# Patient Record
Sex: MALE | Race: WHITE | Employment: FULL TIME | URBAN - METROPOLITAN AREA
[De-identification: names, ages, dates, MRNs, and addresses within clinical notes are randomized per-mention and may not be internally consistent; named-entity substitution may affect disease eponyms.]

---

## 2020-06-29 ENCOUNTER — HOSPITAL ENCOUNTER (OUTPATIENT)
Dept: LAB | Age: 50
Discharge: HOME | End: 2020-06-29

## 2020-06-29 ENCOUNTER — HOSPITAL ENCOUNTER (OUTPATIENT)
Dept: PREADMISSION TESTING | Age: 50
Discharge: HOME | End: 2020-06-29
Payer: COMMERCIAL

## 2020-06-29 DIAGNOSIS — Z01.818 PREOPERATIVE EXAMINATION, UNSPECIFIED: ICD-10-CM

## 2020-06-29 LAB
ATRIAL RATE: 85 BPM
CALCULATED P AXIS, ECG09: 64 DEGREES
CALCULATED R AXIS, ECG10: 82 DEGREES
CALCULATED T AXIS, ECG11: 57 DEGREES
DIAGNOSIS, 93000: NORMAL
P-R INTERVAL, ECG05: 152 MS
Q-T INTERVAL, ECG07: 374 MS
QRS DURATION, ECG06: 84 MS
QTC CALCULATION (BEZET), ECG08: 445 MS
SENTARA SPECIMEN COL,SENBCF: NORMAL
VENTRICULAR RATE, ECG03: 85 BPM

## 2020-06-29 PROCEDURE — 93005 ELECTROCARDIOGRAM TRACING: CPT

## 2020-06-29 PROCEDURE — 99001 SPECIMEN HANDLING PT-LAB: CPT

## 2020-07-09 ENCOUNTER — HOSPITAL ENCOUNTER (OUTPATIENT)
Dept: PREADMISSION TESTING | Age: 50
Discharge: HOME | End: 2020-07-09
Payer: COMMERCIAL

## 2020-07-09 PROCEDURE — 87635 SARS-COV-2 COVID-19 AMP PRB: CPT

## 2020-07-11 LAB — SARS-COV-2, COV2NT: NOT DETECTED

## 2020-07-15 ENCOUNTER — ANESTHESIA EVENT (OUTPATIENT)
Dept: SURGERY | Age: 50
End: 2020-07-15
Payer: COMMERCIAL

## 2020-07-15 ENCOUNTER — ANESTHESIA (OUTPATIENT)
Dept: SURGERY | Age: 50
End: 2020-07-15
Payer: COMMERCIAL

## 2020-07-15 ENCOUNTER — HOSPITAL ENCOUNTER (OUTPATIENT)
Age: 50
Setting detail: OUTPATIENT SURGERY
Discharge: HOME | End: 2020-07-15
Attending: UROLOGY | Admitting: UROLOGY
Payer: COMMERCIAL

## 2020-07-15 VITALS
HEIGHT: 75 IN | SYSTOLIC BLOOD PRESSURE: 153 MMHG | OXYGEN SATURATION: 98 % | TEMPERATURE: 97.3 F | DIASTOLIC BLOOD PRESSURE: 91 MMHG | WEIGHT: 224.19 LBS | RESPIRATION RATE: 18 BRPM | HEART RATE: 79 BPM | BODY MASS INDEX: 27.87 KG/M2

## 2020-07-15 DIAGNOSIS — N40.1 BPH WITH OBSTRUCTION/LOWER URINARY TRACT SYMPTOMS: Primary | ICD-10-CM

## 2020-07-15 DIAGNOSIS — N13.8 BPH WITH OBSTRUCTION/LOWER URINARY TRACT SYMPTOMS: Primary | ICD-10-CM

## 2020-07-15 PROCEDURE — 76210000000 HC OR PH I REC 2 TO 2.5 HR: Performed by: UROLOGY

## 2020-07-15 PROCEDURE — 76010000162 HC OR TIME 1.5 TO 2 HR INTENSV-TIER 1: Performed by: UROLOGY

## 2020-07-15 PROCEDURE — 77030020782 HC GWN BAIR PAWS FLX 3M -B: Performed by: UROLOGY

## 2020-07-15 PROCEDURE — 77030018836 HC SOL IRR NACL ICUM -A: Performed by: UROLOGY

## 2020-07-15 PROCEDURE — 74011000250 HC RX REV CODE- 250: Performed by: ANESTHESIOLOGY

## 2020-07-15 PROCEDURE — 77030034696 HC CATH URETH FOL 2W BARD -A: Performed by: UROLOGY

## 2020-07-15 PROCEDURE — 77030005546 HC CATH URETH FOL 3W BARD -A: Performed by: UROLOGY

## 2020-07-15 PROCEDURE — 77030040831 HC BAG URINE DRNG MDII -A: Performed by: UROLOGY

## 2020-07-15 PROCEDURE — 74011250636 HC RX REV CODE- 250/636: Performed by: UROLOGY

## 2020-07-15 PROCEDURE — 76060000034 HC ANESTHESIA 1.5 TO 2 HR: Performed by: UROLOGY

## 2020-07-15 PROCEDURE — 77030018842 HC SOL IRR SOD CL 9% BAXT -A: Performed by: UROLOGY

## 2020-07-15 PROCEDURE — 77030028158 HC ELECTRD BISOLSR PROST RWOL -B: Performed by: UROLOGY

## 2020-07-15 PROCEDURE — 74011250636 HC RX REV CODE- 250/636: Performed by: NURSE ANESTHETIST, CERTIFIED REGISTERED

## 2020-07-15 PROCEDURE — C1769 GUIDE WIRE: HCPCS | Performed by: UROLOGY

## 2020-07-15 PROCEDURE — 76210000020 HC REC RM PH II FIRST 0.5 HR: Performed by: UROLOGY

## 2020-07-15 PROCEDURE — 74011250637 HC RX REV CODE- 250/637: Performed by: ANESTHESIOLOGY

## 2020-07-15 PROCEDURE — 77030028773 HC KT ACL RAP-PAC DISP S&N -C: Performed by: UROLOGY

## 2020-07-15 PROCEDURE — 77030012508 HC MSK AIRWY LMA AMBU -A: Performed by: ANESTHESIOLOGY

## 2020-07-15 PROCEDURE — 74011250637 HC RX REV CODE- 250/637: Performed by: UROLOGY

## 2020-07-15 PROCEDURE — 74011000250 HC RX REV CODE- 250: Performed by: NURSE ANESTHETIST, CERTIFIED REGISTERED

## 2020-07-15 PROCEDURE — 77030018831 HC SOL IRR H20 BAXT -A: Performed by: UROLOGY

## 2020-07-15 PROCEDURE — 74011250636 HC RX REV CODE- 250/636: Performed by: ANESTHESIOLOGY

## 2020-07-15 RX ORDER — DOCUSATE SODIUM 100 MG/1
100 CAPSULE, LIQUID FILLED ORAL 2 TIMES DAILY
Qty: 30 CAP | Refills: 0 | Status: SHIPPED | OUTPATIENT
Start: 2020-07-15 | End: 2020-07-30

## 2020-07-15 RX ORDER — LABETALOL HYDROCHLORIDE 5 MG/ML
5 INJECTION, SOLUTION INTRAVENOUS ONCE
Status: COMPLETED | OUTPATIENT
Start: 2020-07-15 | End: 2020-07-15

## 2020-07-15 RX ORDER — MAGNESIUM SULFATE 100 %
4 CRYSTALS MISCELLANEOUS AS NEEDED
Status: DISCONTINUED | OUTPATIENT
Start: 2020-07-15 | End: 2020-07-15 | Stop reason: HOSPADM

## 2020-07-15 RX ORDER — SODIUM CHLORIDE, SODIUM LACTATE, POTASSIUM CHLORIDE, CALCIUM CHLORIDE 600; 310; 30; 20 MG/100ML; MG/100ML; MG/100ML; MG/100ML
125 INJECTION, SOLUTION INTRAVENOUS CONTINUOUS
Status: DISCONTINUED | OUTPATIENT
Start: 2020-07-15 | End: 2020-07-15 | Stop reason: HOSPADM

## 2020-07-15 RX ORDER — MIDAZOLAM HYDROCHLORIDE 1 MG/ML
INJECTION, SOLUTION INTRAMUSCULAR; INTRAVENOUS AS NEEDED
Status: DISCONTINUED | OUTPATIENT
Start: 2020-07-15 | End: 2020-07-15 | Stop reason: HOSPADM

## 2020-07-15 RX ORDER — SODIUM CHLORIDE 0.9 % (FLUSH) 0.9 %
5-40 SYRINGE (ML) INJECTION EVERY 8 HOURS
Status: DISCONTINUED | OUTPATIENT
Start: 2020-07-15 | End: 2020-07-15 | Stop reason: HOSPADM

## 2020-07-15 RX ORDER — SODIUM CHLORIDE 0.9 % (FLUSH) 0.9 %
5-40 SYRINGE (ML) INJECTION AS NEEDED
Status: DISCONTINUED | OUTPATIENT
Start: 2020-07-15 | End: 2020-07-15 | Stop reason: HOSPADM

## 2020-07-15 RX ORDER — FENTANYL CITRATE 50 UG/ML
25 INJECTION, SOLUTION INTRAMUSCULAR; INTRAVENOUS
Status: DISCONTINUED | OUTPATIENT
Start: 2020-07-15 | End: 2020-07-15 | Stop reason: HOSPADM

## 2020-07-15 RX ORDER — KETAMINE HYDROCHLORIDE 10 MG/ML
INJECTION, SOLUTION INTRAMUSCULAR; INTRAVENOUS AS NEEDED
Status: DISCONTINUED | OUTPATIENT
Start: 2020-07-15 | End: 2020-07-15 | Stop reason: HOSPADM

## 2020-07-15 RX ORDER — HYDROCODONE BITARTRATE AND ACETAMINOPHEN 5; 325 MG/1; MG/1
1 TABLET ORAL AS NEEDED
Status: DISCONTINUED | OUTPATIENT
Start: 2020-07-15 | End: 2020-07-15 | Stop reason: HOSPADM

## 2020-07-15 RX ORDER — HYDROMORPHONE HYDROCHLORIDE 2 MG/ML
0.2 INJECTION, SOLUTION INTRAMUSCULAR; INTRAVENOUS; SUBCUTANEOUS AS NEEDED
Status: DISCONTINUED | OUTPATIENT
Start: 2020-07-15 | End: 2020-07-15 | Stop reason: HOSPADM

## 2020-07-15 RX ORDER — FENTANYL CITRATE 50 UG/ML
INJECTION, SOLUTION INTRAMUSCULAR; INTRAVENOUS AS NEEDED
Status: DISCONTINUED | OUTPATIENT
Start: 2020-07-15 | End: 2020-07-15 | Stop reason: HOSPADM

## 2020-07-15 RX ORDER — LIDOCAINE HYDROCHLORIDE 20 MG/ML
INJECTION, SOLUTION EPIDURAL; INFILTRATION; INTRACAUDAL; PERINEURAL AS NEEDED
Status: DISCONTINUED | OUTPATIENT
Start: 2020-07-15 | End: 2020-07-15 | Stop reason: HOSPADM

## 2020-07-15 RX ORDER — ONDANSETRON 2 MG/ML
4 INJECTION INTRAMUSCULAR; INTRAVENOUS ONCE
Status: DISCONTINUED | OUTPATIENT
Start: 2020-07-15 | End: 2020-07-15 | Stop reason: HOSPADM

## 2020-07-15 RX ORDER — OXYCODONE AND ACETAMINOPHEN 5; 325 MG/1; MG/1
1 TABLET ORAL
Qty: 15 TAB | Refills: 0 | Status: SHIPPED | OUTPATIENT
Start: 2020-07-15 | End: 2020-07-22

## 2020-07-15 RX ORDER — NALOXONE HYDROCHLORIDE 0.4 MG/ML
0.1 INJECTION, SOLUTION INTRAMUSCULAR; INTRAVENOUS; SUBCUTANEOUS AS NEEDED
Status: DISCONTINUED | OUTPATIENT
Start: 2020-07-15 | End: 2020-07-15 | Stop reason: HOSPADM

## 2020-07-15 RX ORDER — ATROPA BELLADONNA AND OPIUM 16.2; 3 MG/1; MG/1
1 SUPPOSITORY RECTAL
Status: DISCONTINUED | OUTPATIENT
Start: 2020-07-15 | End: 2020-07-15 | Stop reason: HOSPADM

## 2020-07-15 RX ORDER — DEXTROSE MONOHYDRATE 100 MG/ML
125-250 INJECTION, SOLUTION INTRAVENOUS AS NEEDED
Status: DISCONTINUED | OUTPATIENT
Start: 2020-07-15 | End: 2020-07-15 | Stop reason: HOSPADM

## 2020-07-15 RX ORDER — DEXAMETHASONE SODIUM PHOSPHATE 4 MG/ML
INJECTION, SOLUTION INTRA-ARTICULAR; INTRALESIONAL; INTRAMUSCULAR; INTRAVENOUS; SOFT TISSUE AS NEEDED
Status: DISCONTINUED | OUTPATIENT
Start: 2020-07-15 | End: 2020-07-15 | Stop reason: HOSPADM

## 2020-07-15 RX ORDER — DIPHENHYDRAMINE HYDROCHLORIDE 50 MG/ML
12.5 INJECTION, SOLUTION INTRAMUSCULAR; INTRAVENOUS
Status: DISCONTINUED | OUTPATIENT
Start: 2020-07-15 | End: 2020-07-15 | Stop reason: HOSPADM

## 2020-07-15 RX ORDER — ONDANSETRON 2 MG/ML
INJECTION INTRAMUSCULAR; INTRAVENOUS AS NEEDED
Status: DISCONTINUED | OUTPATIENT
Start: 2020-07-15 | End: 2020-07-15 | Stop reason: HOSPADM

## 2020-07-15 RX ORDER — CEFAZOLIN SODIUM 2 G/50ML
2 SOLUTION INTRAVENOUS ONCE
Status: COMPLETED | OUTPATIENT
Start: 2020-07-15 | End: 2020-07-15

## 2020-07-15 RX ORDER — SODIUM CHLORIDE, SODIUM LACTATE, POTASSIUM CHLORIDE, CALCIUM CHLORIDE 600; 310; 30; 20 MG/100ML; MG/100ML; MG/100ML; MG/100ML
150 INJECTION, SOLUTION INTRAVENOUS CONTINUOUS
Status: DISCONTINUED | OUTPATIENT
Start: 2020-07-15 | End: 2020-07-15 | Stop reason: HOSPADM

## 2020-07-15 RX ORDER — CEPHALEXIN 500 MG/1
500 CAPSULE ORAL 2 TIMES DAILY
Qty: 10 CAP | Refills: 0 | Status: SHIPPED | OUTPATIENT
Start: 2020-07-15

## 2020-07-15 RX ORDER — PHENAZOPYRIDINE HYDROCHLORIDE 200 MG/1
200 TABLET, FILM COATED ORAL
Qty: 21 TAB | Refills: 0 | Status: SHIPPED | OUTPATIENT
Start: 2020-07-15 | End: 2020-07-22

## 2020-07-15 RX ORDER — PROPOFOL 10 MG/ML
INJECTION, EMULSION INTRAVENOUS AS NEEDED
Status: DISCONTINUED | OUTPATIENT
Start: 2020-07-15 | End: 2020-07-15 | Stop reason: HOSPADM

## 2020-07-15 RX ORDER — INSULIN LISPRO 100 [IU]/ML
INJECTION, SOLUTION INTRAVENOUS; SUBCUTANEOUS ONCE
Status: DISCONTINUED | OUTPATIENT
Start: 2020-07-15 | End: 2020-07-15 | Stop reason: HOSPADM

## 2020-07-15 RX ORDER — ALBUTEROL SULFATE 0.83 MG/ML
2.5 SOLUTION RESPIRATORY (INHALATION) AS NEEDED
Status: DISCONTINUED | OUTPATIENT
Start: 2020-07-15 | End: 2020-07-15 | Stop reason: HOSPADM

## 2020-07-15 RX ADMIN — KETAMINE HYDROCHLORIDE 10 MG: 10 INJECTION, SOLUTION INTRAMUSCULAR; INTRAVENOUS at 14:43

## 2020-07-15 RX ADMIN — SODIUM CHLORIDE, SODIUM LACTATE, POTASSIUM CHLORIDE, AND CALCIUM CHLORIDE: 600; 310; 30; 20 INJECTION, SOLUTION INTRAVENOUS at 14:36

## 2020-07-15 RX ADMIN — KETAMINE HYDROCHLORIDE 10 MG: 10 INJECTION, SOLUTION INTRAMUSCULAR; INTRAVENOUS at 14:36

## 2020-07-15 RX ADMIN — LABETALOL HYDROCHLORIDE 5 MG: 5 INJECTION, SOLUTION INTRAVENOUS at 17:22

## 2020-07-15 RX ADMIN — SODIUM CHLORIDE, SODIUM LACTATE, POTASSIUM CHLORIDE, AND CALCIUM CHLORIDE 150 ML/HR: 600; 310; 30; 20 INJECTION, SOLUTION INTRAVENOUS at 18:23

## 2020-07-15 RX ADMIN — FENTANYL CITRATE 25 MCG: 50 INJECTION, SOLUTION INTRAMUSCULAR; INTRAVENOUS at 15:38

## 2020-07-15 RX ADMIN — LIDOCAINE HYDROCHLORIDE 80 MG: 20 INJECTION, SOLUTION EPIDURAL; INFILTRATION; INTRACAUDAL; PERINEURAL at 14:41

## 2020-07-15 RX ADMIN — FENTANYL CITRATE 50 MCG: 50 INJECTION, SOLUTION INTRAMUSCULAR; INTRAVENOUS at 14:47

## 2020-07-15 RX ADMIN — FENTANYL CITRATE 25 MCG: 50 INJECTION, SOLUTION INTRAMUSCULAR; INTRAVENOUS at 15:30

## 2020-07-15 RX ADMIN — KETAMINE HYDROCHLORIDE 10 MG: 10 INJECTION, SOLUTION INTRAMUSCULAR; INTRAVENOUS at 15:58

## 2020-07-15 RX ADMIN — ONDANSETRON HYDROCHLORIDE 4 MG: 2 INJECTION INTRAMUSCULAR; INTRAVENOUS at 14:49

## 2020-07-15 RX ADMIN — FENTANYL CITRATE 25 MCG: 50 INJECTION, SOLUTION INTRAMUSCULAR; INTRAVENOUS at 18:14

## 2020-07-15 RX ADMIN — PROPOFOL 160 MG: 10 INJECTION, EMULSION INTRAVENOUS at 14:42

## 2020-07-15 RX ADMIN — DEXAMETHASONE SODIUM PHOSPHATE 4 MG: 4 INJECTION, SOLUTION INTRAMUSCULAR; INTRAVENOUS at 15:42

## 2020-07-15 RX ADMIN — SODIUM CHLORIDE, SODIUM LACTATE, POTASSIUM CHLORIDE, AND CALCIUM CHLORIDE 125 ML/HR: 600; 310; 30; 20 INJECTION, SOLUTION INTRAVENOUS at 12:08

## 2020-07-15 RX ADMIN — CEFAZOLIN SODIUM 2 G: 2 SOLUTION INTRAVENOUS at 14:45

## 2020-07-15 RX ADMIN — SODIUM CHLORIDE, SODIUM LACTATE, POTASSIUM CHLORIDE, AND CALCIUM CHLORIDE: 600; 310; 30; 20 INJECTION, SOLUTION INTRAVENOUS at 15:42

## 2020-07-15 RX ADMIN — ATROPA BELLADONNA AND OPIUM 1 SUPPOSITORY: 16.2; 3 SUPPOSITORY RECTAL at 18:38

## 2020-07-15 RX ADMIN — LABETALOL HYDROCHLORIDE 5 MG: 5 INJECTION, SOLUTION INTRAVENOUS at 17:53

## 2020-07-15 RX ADMIN — MIDAZOLAM 2 MG: 1 INJECTION INTRAMUSCULAR; INTRAVENOUS at 14:36

## 2020-07-15 RX ADMIN — PROPOFOL 40 MG: 10 INJECTION, EMULSION INTRAVENOUS at 15:54

## 2020-07-15 RX ADMIN — HYDROCODONE BITARTRATE AND ACETAMINOPHEN 1 TABLET: 5; 325 TABLET ORAL at 18:59

## 2020-07-15 NOTE — PERIOP NOTES
TRANSFER - OUT REPORT:    Verbal report given to JADA Mckinnon(name) on Juana Sacks  being transferred to phase 2(unit) for routine post - op       Report consisted of patients Situation, Background, Assessment and   Recommendations(SBAR). Information from the following report(s) SBAR, Kardex, OR Summary, Procedure Summary, Intake/Output and MAR was reviewed with the receiving nurse. Lines:   Peripheral IV 07/15/20 Left Hand (Active)   Site Assessment Clean, dry, & intact 07/15/20 1708   Phlebitis Assessment 0 07/15/20 1708   Infiltration Assessment 0 07/15/20 1708   Dressing Status Clean, dry, & intact 07/15/20 1708   Dressing Type Transparent;Tape 07/15/20 1708   Hub Color/Line Status Infusing 07/15/20 1708        Opportunity for questions and clarification was provided.       Patient transported with:   Registered Nurse  Tech

## 2020-07-15 NOTE — ANESTHESIA POSTPROCEDURE EVALUATION
Post-Anesthesia Evaluation and Assessment    Cardiovascular Function/Vital Signs  Visit Vitals  BP (!) 146/94   Pulse 75   Temp 36.3 °C (97.3 °F)   Resp 15   Ht 6' 3\" (1.905 m)   Wt 101.7 kg (224 lb 3 oz)   SpO2 98%   BMI 28.02 kg/m²       Patient is status post Procedure(s):  GREENLIGHT LASER OF PROSTATE. Nausea/Vomiting: Controlled. Postoperative hydration reviewed and adequate. Pain:  Pain Scale 1: Visual (07/15/20 1830)  Pain Intensity 1: 0 (07/15/20 1830)   Managed. Neurological Status:   Neuro (WDL): Exceptions to WDL (07/15/20 1735)   At baseline. Mental Status and Level of Consciousness: Arousable. Pulmonary Status:   O2 Device: Room air (07/15/20 1810)   Adequate oxygenation and airway patent. Complications related to anesthesia: None    Post-anesthesia assessment completed. H/o HTN. Within 20% of baseline 160/91 on chart review. Patient has met all discharge requirements.     Signed By: Andrea Segura MD    July 15, 2020

## 2020-07-15 NOTE — ANESTHESIA PREPROCEDURE EVALUATION
Relevant Problems   No relevant active problems       Anesthetic History   No history of anesthetic complications            Review of Systems / Medical History  Patient summary reviewed, nursing notes reviewed and pertinent labs reviewed    Pulmonary  Within defined limits                 Neuro/Psych   Within defined limits           Cardiovascular    Hypertension              Exercise tolerance: >4 METS     GI/Hepatic/Renal     GERD           Endo/Other  Within defined limits           Other Findings              Physical Exam    Airway  Mallampati: II  TM Distance: 4 - 6 cm  Neck ROM: normal range of motion   Mouth opening: Normal     Cardiovascular  Regular rate and rhythm,  S1 and S2 normal,  no murmur, click, rub, or gallop             Dental  No notable dental hx       Pulmonary  Breath sounds clear to auscultation               Abdominal  GI exam deferred       Other Findings            Anesthetic Plan    ASA: 2  Anesthesia type: general          Induction: Intravenous  Anesthetic plan and risks discussed with: Patient

## 2020-07-15 NOTE — PERIOP NOTES
Patient admits to having a responsible adult care for them for at least 24 hours after surgery. Dual skin assessment completed by Manoj ELAIS and [facility]

## 2020-07-15 NOTE — H&P
Update History & Physical    The Patient's History and Physical of June 18/2020,  was reviewed with the patient and I examined the patient. There was no change. The surgical site was confirmed by the patient and me. Plan:  The risk, benefits, expected outcome, and alternative to the recommended procedure have been discussed with the patient. Patient understands and wants to proceed with the procedure. Electronically signed by Georgiana Keen MD on 7/15/2020 at 2:22 PM      [facility]  [id number]  Tel: [phone]  Fax: [phone]      Patient: Gloria Bryant  YOB: 1970  Date: 06/18/2020 3:00 PM   Visit Type: Office Visit        Assessment/Plan  # Detail Type Description    1. Assessment Benign non-nodular prostatic hyperplasia with lower urinary tract symptoms (N40.1). Patient Plan I discussed with the patient regarding his failed medical therapy for BPH. We had previously discussed with the vapor the prostate verses GreenLight. Her fortunately his insurance is not covering the word of a pr of the prostate. He has elected to proceed with the GreenLight PVP. I discussed the risk benefits and the alternatives any for intervention, Delgado catheter, UTI, stage procedure, blood in the urine. He understands wishes to proceed. He will undergo P AT per hospital protocol. All questions were answered today. He will be tentatively [facility] for [facility]15/2020 [facility].  Urine culture sent today. Plan Orders URINE CULTURE to be performed. Obtained on 06/18/2020.         2. Assessment Poor urinary stream (R39.12). 3. Other Orders Orders not associated to today's assessments. Plan Orders URINE CULTURE to be performed. Obtained on 06/18/2020. This 52year old male presents for BPH and erectile dysfunction uro. History of Present Illness:  1. BPH   Onset was gradual. Severity level is 6.  It occurs daily. The problem is worse. Associated symptoms include incomplete emptying, intermittent stream, nocturia (2 times per night), slow stream and urinary frequency (every 2 hours). Pertinent negatives include chills, constipation, dysuria, fever, hematuria, urgency, urinary incontinence and urinary straining. Additional information: Pt was placed on flomax years ago and caused sexual side effects. 4 weeks of flomax no improvement. Symptoms getting worse. Patient is unhappy with the symptoms. Unfortunately patient insurance is not covering rezum water vapor the prostate at this point. .      2.  erectile dysfunction uro   The symptoms began gradually and have been moderate. The patient is here today for an initial visit. The patient states he does have difficultly attaining an erection and has difficulty maintaining an erection. Pertinent history includes use of diuretics but not diabetes or neurologic disease. He also complains of difficulty achieving erections and difficulty maintaining erections but denies depression, morning erections or nocturnal erections. Additional information: Weak erections for couple of years now. Getting worse. PAST MEDICAL/SURGICAL HISTORY   (Reviewed, updated)    Disease/disorder Onset Date Management Date Comments   knee 2011   ANR 05/29/2018 -   Back 2008   ANR 05/29/2018 -   Hernia 1976   ANR 05/29/2018 -   Hypertension             PROBLEM LIST:   Problem List reviewed.    Problem Description Onset Date Chronic Clinical Status Notes   Poor urinary stream  N           Medications (active prior to today)  Medication Name Sig Description Start Date Stop Date Refilled Rx Elsewhere   irbesartan 150 mg tablet take 1 tablet by oral route  every day //   Y   hydrochlorothiazide 12.5 mg tablet take 1 tablet by oral route  every day //   Y   pantoprazole 40 mg tablet,delayed release take 1 tablet by oral route  every day //   Y   tadalafil 20 mg tablet take 1 tablet by oral route  every day 2020   N     Medication Reconciliation  Medications reconciled today. Allergies  Ingredient Reaction (Severity) Medication Name Comment   NO KNOWN ALLERGIES        Reviewed, no changes. Family History  (Reviewed, updated)  Relationship Family Member Name  Age at Death Condition Onset Age Cause of Death   Father  Y 43 Cancer - bone     Mother    Cancer - breast           Social History:  (Reviewed, updated)  Tobacco use reviewed. Preferred language is [place]. MARITAL STATUS/FAMILY/SOCIAL SUPPORT  Marital status: Single   Smoking status: Unknown if ever smoked. TOBACCO SCREENING:  It is unknown if the patient has ever used tobacco.     SMOKING STATUS  Type Smoking Status Usage Per Day Years Used Pack Years Total Pack Years    Unknown if ever smoked                         Review of Systems  System Neg/Pos Details   Constitutional Negative Chills and Fever. ENMT Negative Ear infections and Sore throat. Eyes Negative Blurred vision, Double vision and Eye pain. Respiratory Negative Asthma, Chronic cough, Dyspnea and Wheezing. Cardio Negative Chest pain. GI Negative Constipation, Decreased appetite, Diarrhea, Nausea and Vomiting.  Positive Difficulty achieving erections, Difficulty maintaining erections, Incomplete emptying, Intermittent urinary stream, Nocturia, Slow stream, Urinary frequency.  Negative Dysuria, Hematuria, Morning erections, Nocturnal erections, Urgency, Urinary incontinence and Urinary straining. Endocrine Negative Cold intolerance, Heat intolerance, Increased thirst and Weight loss. Neuro Negative Headache and Tremors. Psych Negative Anxiety and Depression. Integumentary Negative Itching skin and Rash. MS Negative Back pain and Joint pain. Hema/Lymph Negative Easy bleeding. Reproductive Negative Sexual dysfunction.      Vital Signs   Height  Time ft in cm Last Measured Height Position   3:25 PM 6.0 3.00 190.50 2018 0 3:24 PM 6.0 3.00 190.50 05/29/2018 0   Weight/BSA/BMI  Time lb oz kg Context BMI kg/m2 BSA m2   3:25 .00  101.151  27.87    Measured By  Time Measured by   3:25 PM [facility]   3:24 PM [facility]     Physical Exam  Exam Findings Details   Constitutional Normal Well developed. Respiratory Normal Inspection - Normal.   Musculoskeletal Normal Gait - Normal.   Neurological Normal Level of consciousness - Normal. Orientation - Normal. Memory - Normal.   Psychiatric Normal Oriented to time, place, person and situation. Appropriate mood and affect.          Medications (added, continued, or stopped today)  Start Date Medication Directions PRN Status PRN Reason Instruction Stop Date    hydrochlorothiazide 12.5 mg tablet take 1 tablet by oral route  every day N       irbesartan 150 mg tablet take 1 tablet by oral route  every day N       pantoprazole 40 mg tablet,delayed release take 1 tablet by oral route  every day N      02/18/2020 tadalafil 20 mg tablet take 1 tablet by oral route  every day N      Active Patient Care Team Members    Name Contact Agency Type Support Role Relationship Active Date Inactive Date Specialty   Federico Rios   Patient provider PCP      Radha Ontiveros   encounter provider    Urology   Mark Parsons   Emergency Contact Other          Provider: Fortino Love MD 06/18/2020 03:00 PM  Document generated by:  Glenny Almanzar 06/22/2020 12:46 PM  CC Providers:  Federico Rios   [address] [place] [address]-  [phone]          Electronically signed by Fortino Love MD on 06/22/2020 12:59 PM

## 2020-07-15 NOTE — DISCHARGE INSTRUCTIONS
2 [facility], Urology     [facility], [address], [address] [place] [address]  Office: [phone]  Fax:    [phone] PVP  __  Notify [facility] IMMEDIATELY if any of the following occur:     You are unable to urinate. Urgency to urinate is not uncommon.   You find yourself urinating small frequent amounts associated with severe lower abdominal discomfort.   Bright red blood with clots in the urine. Some reddish urine is not uncommon and should be treated with increasing the amount of fluids you drink.   Temperature above 101.5° and / or chills.   You are nauseous and / or vomiting and you cannot hold down any fluids.   Your pain is not controlled with the pain medication prescribed. Special Considerations:      Do not drive for at least 5 days after the procedure and until you are no longer taking narcotic pain medication and you are able to move and react without hesitation.  You may return to work in 1 week.  _x_  No heavy lifting over 10 pounds & no strenuous exercise for 1 week     _x_  Our office will call you to make an appointment Monday for dunaway cath removal.     Please contact [facility]. Urology at [phone] or go to the nearest Emergency Department / Urgent Care facility for any other medical questions or concerns. DISCHARGE SUMMARY from Nurse    PATIENT INSTRUCTIONS:    After general anesthesia or intravenous sedation, for 24 hours or while taking prescription Narcotics:  · Limit your activities  · Do not drive and operate hazardous machinery  · Do not make important personal or business decisions  · Do  not drink alcoholic beverages  · If you have not urinated within 8 hours after discharge, please contact your surgeon on call.     Report the following to your surgeon:  · Excessive pain, swelling, redness or odor of or around the surgical area  · Temperature over 100.5  · Nausea and vomiting lasting longer than 4 hours or if unable to take medications  · Any signs of decreased circulation or nerve impairment to extremity: change in color, persistent  numbness, tingling, coldness or increase pain  · Any questions    What to do at Home:  Recommended activity: Activity as tolerated and No driving while on analgesics,     If you experience any of the following symptoms fever, chills, persistent nausea and vomiting, and uncontrollable pain, please follow up with Dr. Audrey Lowe. *  Please give a list of your current medications to your Primary Care Provider. *  Please update this list whenever your medications are discontinued, doses are      changed, or new medications (including over-the-counter products) are added. *  Please carry medication information at all times in case of emergency situations. These are general instructions for a healthy lifestyle:    No smoking/ No tobacco products/ Avoid exposure to second hand smoke  Surgeon General's Warning:  Quitting smoking now greatly reduces serious risk to your health. Obesity, smoking, and sedentary lifestyle greatly increases your risk for illness    A healthy diet, regular physical exercise & weight monitoring are important for maintaining a healthy lifestyle    You may be retaining fluid if you have a history of heart failure or if you experience any of the following symptoms:  Weight gain of 3 pounds or more overnight or 5 pounds in a week, increased swelling in our hands or feet or shortness of breath while lying flat in bed. Please call your doctor as soon as you notice any of these symptoms; do not wait until your next office visit. The discharge information has been reviewed with the patient and caregiver. The patient and caregiver verbalized understanding.   Discharge medications reviewed with the patient and caregiver and appropriate educational materials and side effects teaching were provided. ___________________________________________________________________________________________________________________________________       Learning About Coronavirus ([id number])  Coronavirus [phone]): Overview  What is coronavirus (HIEMP-23)? The coronavirus disease (COVID-19) is caused by a virus. It is an illness that was first found in [place], [place], in December 2019. It has since spread worldwide. The virus can cause fever, cough, and trouble breathing. In severe cases, it can cause pneumonia and make it hard to breathe without help. It can cause death. Coronaviruses are a large group of viruses. They cause the common cold. They also cause more serious illnesses like Middle East respiratory syndrome (MERS) and severe acute respiratory syndrome (SARS). COVID-19 is caused by a novel coronavirus. That means it's a new type that has not been seen in people before. This virus spreads person-to-person through droplets from coughing and sneezing. It can also spread when you are close to someone who is infected. And it can spread when you touch something that has the virus on it, such as a doorknob or a tabletop. What can you do to protect yourself from coronavirus (COVID-19)? The best way to protect yourself from getting sick is to:  · Avoid areas where there is an outbreak. · Avoid contact with people who may be infected. · Wash your hands often with soap or alcohol-based hand sanitizers. · Avoid crowds and try to stay at least 6 feet away from other people. · Wash your hands often, especially after you cough or sneeze. Use soap and water, and scrub for at least 20 seconds. If soap and water aren't available, use an alcohol-based hand . · Avoid touching your mouth, nose, and eyes. What can you do to avoid spreading the virus to others? To help avoid spreading the virus to others:  · Cover your mouth with a tissue when you cough or sneeze. Then throw the tissue in the trash.   · Use a disinfectant to clean things that you touch often. · Wear a cloth face cover if you have to go to public areas. · Stay home if you are sick or have been exposed to the virus. Don't go to school, work, or public areas. And don't use public transportation, ride-shares, or taxis unless you have no choice. · If you are sick:  ? Leave your home only if you need to get medical care. But call the doctor's office first so they know you're coming. And wear a face cover. ? Wear the face cover whenever you're around other people. It can help stop the spread of the virus when you cough or sneeze. ? Clean and disinfect your home every day. Use household  and disinfectant wipes or sprays. Take special care to clean things that you grab with your hands. These include doorknobs, remote controls, phones, and handles on your refrigerator and microwave. And don't forget countertops, tabletops, bathrooms, and computer keyboards. When to call for help  [id number] anytime you think you may need emergency care. For example, call if:  · You have severe trouble breathing. (You can't talk at all.)  · You have constant chest pain or pressure. · You are severely dizzy or lightheaded. · You are confused or can't think clearly. · Your face and lips have a blue color. · You pass out (lose consciousness) or are very hard to wake up. Call your doctor now if you develop symptoms such as:  · Shortness of breath. · Fever. · Cough. If you need to get care, call ahead to the doctor's office for instructions before you go. Make sure you wear a face cover to prevent exposing other people to the virus. Where can you get the latest information? The following health organizations are tracking and studying this virus. Their websites contain the most up-to-date information. Matt Aguilar also learn what to do if you think you may have been exposed to the virus. · U.S. Centers for Disease Control and Prevention (CDC):  The CDC provides updated news about the disease and travel advice. The website also tells you how to prevent the spread of infection. www.cdc.gov  · World Health Organization [facility]): WHO offers information about the virus outbreaks. WHO also has travel advice. www.who.int  Current as of: May 8, 2020               Content Version: 12.5  © 2006-2020 Healthwise, [employer]. Care instructions adapted under license by [employer] (which disclaims liability or warranty for this information). If you have questions about a medical condition or this instruction, always ask your healthcare professional. [address] any warranty or liability for your use of this information.     Patient {ARMBANDS:[id number]}

## 2020-07-17 NOTE — OP NOTES
Patient: Trino Garcia  YOB: 1970  MRN: [id number]    Date of Procedure: 7/15/2020     Pre-Op Diagnosis: BPH WITH OBSTRUCTION    Post-Op Diagnosis: Same as preoperative diagnosis. Procedure(s):  GREENLIGHT LASER OF PROSTATE    Surgeon(s):  Raad Ontiveros MD    Surgical Assistant: None    Anesthesia: General     Estimated Blood Loss (mL): Minimal    Complications: None    Specimens: None    Drains: 18fr 3 way dunaway catheter    Findings:   Obstructive lateral lobe of prostate. Lasering time : 23:50min  Energy: 192, 468 joules    DESCRIPTION OF PROCEDURE:  After informed consent was obtained, the patient was taken to the operating room, and he underwent laryngeal mask anesthesia in the supine position. He was then prepped and draped in the usual surgical fashion after being placed in the dorsal lithotomy position. A 23-Argentine laser cystoscope was inserted with visual obturator per urethra into the bladder. The ureteral orifices were identified. The verumontanum was identified. Next, the visual obturator was exchanged for a laser guide and the GreenLight laser fiber was inserted through the laser guide. Lasering was begun at the 6 o'clock position at the bladder neck and proceeded towards the verumontanum. Next, the left lobe was taken down and the right lobe was taken down. The bladder was then drained, refilled with low-volume. The remainder of the excess prostatic adenoma was removed with the laser. The bladder was redrained, refilled at low-volume. There was no active bleeding. The procedure ended at this point. Both ureteral orifices and the verumontanum were intact at the end of the procedure. The bladder was filled. The laser was placed on standby and the cystoscope was removed. A 18-Argentine, 3 mL three-way Dunaway catheter was inserted per urethra into the bladder draining clear urine. Thirty mL of sterile water was placed in the balloon.  The patient was then washed off, dried, and placed in the supine position. He was awakened from anesthesia and then transferred to the post anesthesia care unit.         Electronically Signed by Ynes Copeland MD on 7/17/2020 at 5:08 PM

## (undated) DEVICE — SOL IRR STRL H2O 3000ML BG -- USE ITEM 173640

## (undated) DEVICE — CATHETER URETH 20FR 30CC BLLN SIL ELASTMR F 3 W SPEC M RND

## (undated) DEVICE — BAG URIN LEG DISPOZ-A-BG 19OZ -- W/18IN EXT TUBING

## (undated) DEVICE — GDWIRE UROL STR 150CM FLX TP -- BX/5 SENSOR

## (undated) DEVICE — DRAINBAG,ANTI-REFLUX TOWER,L/F,2000ML,LL: Brand: MEDLINE

## (undated) DEVICE — SYR LR LCK 1ML GRAD NSAF 30ML --

## (undated) DEVICE — SOLUTION IRRIG 1500ML 0.9% SOD CHL USP POUR PLAS BTL

## (undated) DEVICE — SOLUTION IRRIG 3000ML 0.9% SOD CHL FLX CONT 0797208] ICU MEDICAL INC]

## (undated) DEVICE — POUCH DRNGE FLX BND INTEGR RAIL CLMP DISP EZ CTCH

## (undated) DEVICE — SET ADMIN IV PMP 20GTT 117IN -- 2 NDLS INJ SITE

## (undated) DEVICE — CUTTING ELECTRODE BIPO 24FR 12/30°  FOR RESECTOSCOPES, TELESCOPE Ø 4MM, FOR SHEATHS, INTERMITTENT/CONTINUOUS IRRIGATION, 24/26, FR, LOOP: ROUND, WIRE Ø 0.3MM, FORK COLOR BLUE, STEM COLOR BLUE, PACK=3 PCS, FOR SHARK AND S-LINE RESECTOSCOPES, STERILE, FOR SINGLE USE: Brand: SHARK/S-LINE

## (undated) DEVICE — STERILE POLYISOPRENE POWDER-FREE SURGICAL GLOVES: Brand: PROTEXIS

## (undated) DEVICE — 1.2 RAP-PAC B LATEX FREE: Brand: RAP-PAC

## (undated) DEVICE — SYRINGE,TOOMEY,IRRIGATION,70CC,STERILE: Brand: MEDLINE

## (undated) DEVICE — CYSTO PACK: Brand: MEDLINE INDUSTRIES, INC.

## (undated) DEVICE — [FOUR SPIKE IRRIGATOR SET,  NON-PYROGENIC FLUID PATH,  DO NOT USE IF PACKAGE IS DAMAGED]

## (undated) DEVICE — CATH URETH FOL 2W MED 18FRX30 -- LUBRICATH

## (undated) DEVICE — DEVICE SECUREMENT 1/32IN POLYETH FOAM F ANCHR URIN CATH